# Patient Record
(demographics unavailable — no encounter records)

---

## 2025-01-15 NOTE — REASON FOR VISIT
[Cardiac Failure] : cardiac failure [Hyperlipidemia] : hyperlipidemia [Hypertension] : hypertension [Coronary Artery Disease] : coronary artery disease [FreeTextEntry1] : I had the pleasure of re-evaluating your patient, Mr. Raphael Wise.  He had presented again to an OSH with ADHF in 2/12/24 and discharged on 2/15/24. I last saw him in July 2024.  In February 2024, he presented with ADHF and noted to have a new cardiomyopathy. Echocardiogram noted LVEF 29%.  Nuclear stress test performed in July 2023 did not demonstrate the presence of ischemia.  His etiology is likely hypertensive CM.  He is a 66-year-old man with a history of HTN, DM2, NHL.  We had initiated medical therapy for NICM including Entresto and Farxiga but he had was unable to start these medications due to insurance/cost issues. He is now on losartan instead. He is also on Coreg, Jardiance, and spironolactone.  He has remained stable without cardiovascular complaints since his last visit. Losing weight intentionally. Changed his dietary lifestyle.  EKG SR Hypertensive in the office (he has [facility]--better at home). Appears euvolemic on exam today.    Reports compliance with medications -- continue current regimen.  Refills provided. Plan to re-evaluate with TTE prior to his next visit in six months. Continue to monitor BPs at home. F/u in 6 months.

## 2025-07-16 NOTE — REASON FOR VISIT
[Symptom and Test Evaluation] : symptom and test evaluation [CV Risk Factors and Non-Cardiac Disease] : CV risk factors and non-cardiac disease [Cardiac Failure] : cardiac failure [FreeTextEntry1] : I had the pleasure of re-evaluating your patient, Mr. Raphael Wise.   He had presented recently to the [facility] ED with symptoms consistent with ADHF. He was diuresed and had felt better immediately after. He has remained stable since discharge. He has not yet undergone recent cardiac testing.  He is a 67-year-old man with a history of HTN, DM2, NHL.  In February 2024, he presented with ADHF and noted to have a new cardiomyopathy. Echocardiogram noted LVEF 29%.  Nuclear stress test performed in July 2023 did not demonstrate the presence of ischemia.  His etiology is likely hypertensive CM.  We had initiated medical therapy for NICM including Entresto and Farxiga but he had was unable to start these medications due to insurance/cost issues. He is now on losartan instead. He is also on Coreg, Jardiance, and spironolactone. He is now on furosemide 20 mg daily as well  EKG SR Appears euvolemic on exam today.    Reports compliance with medications -- continue current regimen.  Refills provided. Plan to re-evaluate with TTE prior to his next visit in six months. Continue to monitor BPs at home, daily standing weight, salt and fluid restriction.  F/u in 1 to 2 months.